# Patient Record
Sex: FEMALE | Race: BLACK OR AFRICAN AMERICAN | NOT HISPANIC OR LATINO | Employment: OTHER | URBAN - METROPOLITAN AREA
[De-identification: names, ages, dates, MRNs, and addresses within clinical notes are randomized per-mention and may not be internally consistent; named-entity substitution may affect disease eponyms.]

---

## 2018-04-05 ENCOUNTER — HISTORICAL (OUTPATIENT)
Dept: ADMINISTRATIVE | Facility: HOSPITAL | Age: 63
End: 2018-04-05

## 2018-04-05 LAB
ABS NEUT (OLG): 2.77 X10(3)/MCL (ref 2.1–9.2)
ALBUMIN SERPL-MCNC: 3.1 GM/DL (ref 3.4–5)
ALBUMIN/GLOB SERPL: 1 RATIO (ref 1–2)
ALP SERPL-CCNC: 124 UNIT/L (ref 45–117)
ALT SERPL-CCNC: 12 UNIT/L (ref 12–78)
APPEARANCE, UA: CLEAR
AST SERPL-CCNC: 17 UNIT/L (ref 15–37)
BACTERIA #/AREA URNS AUTO: ABNORMAL /[HPF]
BASOPHILS # BLD AUTO: 0.02 X10(3)/MCL
BASOPHILS NFR BLD AUTO: 0 %
BILIRUB SERPL-MCNC: 0.4 MG/DL (ref 0.2–1)
BILIRUB UR QL STRIP: NEGATIVE
BILIRUBIN DIRECT+TOT PNL SERPL-MCNC: 0.1 MG/DL
BILIRUBIN DIRECT+TOT PNL SERPL-MCNC: 0.3 MG/DL
BUN SERPL-MCNC: 8 MG/DL (ref 7–18)
CALCIUM SERPL-MCNC: 8.8 MG/DL (ref 8.5–10.1)
CHLORIDE SERPL-SCNC: 107 MMOL/L (ref 98–107)
CHOLEST SERPL-MCNC: 117 MG/DL
CHOLEST/HDLC SERPL: 4.2 {RATIO} (ref 0–4.4)
CO2 SERPL-SCNC: 28 MMOL/L (ref 21–32)
COLOR UR: ABNORMAL
CREAT SERPL-MCNC: 1 MG/DL (ref 0.6–1.3)
EOSINOPHIL # BLD AUTO: 0.42 10*3/UL
EOSINOPHIL NFR BLD AUTO: 8 %
ERYTHROCYTE [DISTWIDTH] IN BLOOD BY AUTOMATED COUNT: 14.6 % (ref 11.5–14.5)
EST. AVERAGE GLUCOSE BLD GHB EST-MCNC: 157 MG/DL
GLOBULIN SER-MCNC: 4.7 GM/ML (ref 2.3–3.5)
GLUCOSE (UA): NORMAL
GLUCOSE SERPL-MCNC: 106 MG/DL (ref 74–106)
HBA1C MFR BLD: 7.1 % (ref 4.2–6.3)
HCT VFR BLD AUTO: 43.7 % (ref 35–46)
HDLC SERPL-MCNC: 28 MG/DL
HGB BLD-MCNC: 14.2 GM/DL (ref 12–16)
HGB UR QL STRIP: NEGATIVE
HYALINE CASTS #/AREA URNS LPF: ABNORMAL /[LPF]
IMM GRANULOCYTES # BLD AUTO: 0.02 10*3/UL
IMM GRANULOCYTES NFR BLD AUTO: 0 %
KETONES UR QL STRIP: NEGATIVE
LDLC SERPL CALC-MCNC: 65 MG/DL (ref 0–130)
LEUKOCYTE ESTERASE UR QL STRIP: 25 LEU/UL
LYMPHOCYTES # BLD AUTO: 1.45 X10(3)/MCL
LYMPHOCYTES NFR BLD AUTO: 29 % (ref 13–40)
MCH RBC QN AUTO: 29.8 PG (ref 26–34)
MCHC RBC AUTO-ENTMCNC: 32.5 GM/DL (ref 31–37)
MCV RBC AUTO: 91.6 FL (ref 80–100)
MONOCYTES # BLD AUTO: 0.34 X10(3)/MCL
MONOCYTES NFR BLD AUTO: 7 % (ref 4–12)
NEUTROPHILS # BLD AUTO: 2.77 X10(3)/MCL
NEUTROPHILS NFR BLD AUTO: 55 X10(3)/MCL
NITRITE UR QL STRIP: NEGATIVE
PH UR STRIP: 6.5 [PH] (ref 4.5–8)
PLATELET # BLD AUTO: 260 X10(3)/MCL (ref 130–400)
PMV BLD AUTO: 10.5 FL (ref 7.4–10.4)
POTASSIUM SERPL-SCNC: 3.9 MMOL/L (ref 3.5–5.1)
PROT SERPL-MCNC: 7.8 GM/DL (ref 6.4–8.2)
PROT UR QL STRIP: NEGATIVE
RBC # BLD AUTO: 4.77 X10(6)/MCL (ref 4–5.2)
RBC #/AREA URNS AUTO: ABNORMAL /[HPF]
SODIUM SERPL-SCNC: 140 MMOL/L (ref 136–145)
SP GR UR STRIP: 1 (ref 1–1.03)
SQUAMOUS #/AREA URNS LPF: ABNORMAL /[LPF]
T4 FREE SERPL-MCNC: 1.38 NG/DL (ref 0.76–1.46)
TRIGL SERPL-MCNC: 122 MG/DL
TSH SERPL-ACNC: 3.83 MIU/L (ref 0.36–3.74)
UROBILINOGEN UR STRIP-ACNC: NORMAL
VLDLC SERPL CALC-MCNC: 24 MG/DL
WBC # SPEC AUTO: 5 X10(3)/MCL (ref 4.5–11)
WBC #/AREA URNS AUTO: ABNORMAL /HPF

## 2021-01-17 ENCOUNTER — HOSPITAL ENCOUNTER (OUTPATIENT)
Dept: OCCUPATIONAL THERAPY | Facility: HOSPITAL | Age: 66
End: 2021-01-20
Attending: INTERNAL MEDICINE | Admitting: INTERNAL MEDICINE

## 2021-01-17 LAB
ABS NEUT (OLG): 5.33 X10(3)/MCL (ref 2.1–9.2)
ALBUMIN SERPL-MCNC: 2.6 GM/DL (ref 3.4–4.8)
ALBUMIN/GLOB SERPL: 0.7 RATIO (ref 1.1–2)
ALP SERPL-CCNC: 82 UNIT/L (ref 40–150)
ALT SERPL-CCNC: 7 UNIT/L (ref 0–55)
ANION GAP SERPL CALC-SCNC: 12 MMOL/L
APPEARANCE, UA: CLEAR
AST SERPL-CCNC: 17 UNIT/L (ref 5–34)
BACTERIA SPEC CULT: ABNORMAL /HPF
BASOPHILS # BLD AUTO: 0 X10(3)/MCL (ref 0–0.2)
BASOPHILS NFR BLD AUTO: 0 %
BILIRUB SERPL-MCNC: 0.4 MG/DL
BILIRUB UR QL STRIP: NEGATIVE
BILIRUBIN DIRECT+TOT PNL SERPL-MCNC: 0.2 MG/DL (ref 0–0.5)
BILIRUBIN DIRECT+TOT PNL SERPL-MCNC: 0.2 MG/DL (ref 0–0.8)
BUN SERPL-MCNC: 18.6 MG/DL (ref 9.8–20.1)
BUN SERPL-MCNC: 25 MG/DL (ref 8–26)
CALCIUM SERPL-MCNC: 8.3 MG/DL (ref 8.4–10.2)
CHLORIDE SERPL-SCNC: 104 MMOL/L (ref 98–109)
CHLORIDE SERPL-SCNC: 106 MMOL/L (ref 98–107)
CO2 SERPL-SCNC: 22 MMOL/L (ref 23–31)
COLOR UR: YELLOW
CREAT SERPL-MCNC: 2.74 MG/DL (ref 0.55–1.02)
CREAT SERPL-MCNC: 3.1 MG/DL (ref 0.6–1.3)
EOSINOPHIL # BLD AUTO: 0.5 X10(3)/MCL (ref 0–0.9)
EOSINOPHIL NFR BLD AUTO: 6 %
ERYTHROCYTE [DISTWIDTH] IN BLOOD BY AUTOMATED COUNT: 21.2 % (ref 11.5–17)
GLOBULIN SER-MCNC: 3.5 GM/DL (ref 2.4–3.5)
GLUCOSE (UA): NEGATIVE
GLUCOSE SERPL-MCNC: 101 MG/DL (ref 82–115)
GLUCOSE SERPL-MCNC: 103 MG/DL (ref 70–105)
GROUP & RH: NORMAL
HCT VFR BLD AUTO: 24.4 % (ref 37–47)
HCT VFR BLD CALC: 25 % (ref 38–51)
HGB BLD-MCNC: 7.5 GM/DL (ref 12–16)
HGB BLD-MCNC: 8.5 MG/DL (ref 12–17)
HGB UR QL STRIP: ABNORMAL
KETONES UR QL STRIP: NEGATIVE
LEUKOCYTE ESTERASE UR QL STRIP: ABNORMAL
LYMPHOCYTES # BLD AUTO: 1.8 X10(3)/MCL (ref 0.6–4.6)
LYMPHOCYTES NFR BLD AUTO: 22 %
MCH RBC QN AUTO: 26.5 PG (ref 27–31)
MCHC RBC AUTO-ENTMCNC: 30.7 GM/DL (ref 33–36)
MCV RBC AUTO: 86.2 FL (ref 80–94)
MONOCYTES # BLD AUTO: 0.6 X10(3)/MCL (ref 0.1–1.3)
MONOCYTES NFR BLD AUTO: 7 %
NEUTROPHILS # BLD AUTO: 5.33 X10(3)/MCL (ref 2.1–9.2)
NEUTROPHILS NFR BLD AUTO: 64 %
NITRITE UR QL STRIP: NEGATIVE
PH UR STRIP: 5 [PH] (ref 5–9)
PLATELET # BLD AUTO: 251 X10(3)/MCL (ref 130–400)
PMV BLD AUTO: 10.9 FL (ref 9.4–12.4)
POC IONIZED CALCIUM: 1.11 MMOL/L (ref 1.12–1.32)
POC TCO2: 27 MMOL/L (ref 24–29)
POTASSIUM BLD-SCNC: 4.6 MMOL/L (ref 3.5–4.9)
POTASSIUM SERPL-SCNC: 4.8 MMOL/L (ref 3.5–5.1)
PROT SERPL-MCNC: 6.1 GM/DL (ref 5.8–7.6)
PROT UR QL STRIP: NEGATIVE
RBC # BLD AUTO: 2.83 X10(6)/MCL (ref 4.2–5.4)
RBC #/AREA URNS HPF: ABNORMAL /[HPF]
SODIUM BLD-SCNC: 137 MMOL/L (ref 138–146)
SODIUM SERPL-SCNC: 140 MMOL/L (ref 136–145)
SP GR UR STRIP: 1.02 (ref 1–1.03)
SQUAMOUS EPITHELIAL, UA: ABNORMAL
UROBILINOGEN UR STRIP-ACNC: 0.2
WBC # SPEC AUTO: 8.3 X10(3)/MCL (ref 4.5–11.5)
WBC #/AREA URNS HPF: 33 /HPF (ref 0–3)

## 2021-01-18 LAB
CROSSMATCH INTERPRETATION: NORMAL
HCT VFR BLD AUTO: 27.1 % (ref 37–47)
HGB BLD-MCNC: 8.6 GM/DL (ref 12–16)
PRODUCT READY: NORMAL
PRODUCT READY: NORMAL
TRANSFUSION ORDER: NORMAL

## 2021-01-19 LAB
ABS NEUT (OLG): 5.45 X10(3)/MCL (ref 2.1–9.2)
BASOPHILS # BLD AUTO: 0 X10(3)/MCL (ref 0–0.2)
BASOPHILS NFR BLD AUTO: 0 %
BUN SERPL-MCNC: 9.4 MG/DL (ref 9.8–20.1)
CALCIUM SERPL-MCNC: 7.6 MG/DL (ref 8.4–10.2)
CHLORIDE SERPL-SCNC: 110 MMOL/L (ref 98–107)
CO2 SERPL-SCNC: 20 MMOL/L (ref 23–31)
CREAT SERPL-MCNC: 1.49 MG/DL (ref 0.55–1.02)
CREAT/UREA NIT SERPL: 6
EOSINOPHIL # BLD AUTO: 0.5 X10(3)/MCL (ref 0–0.9)
EOSINOPHIL NFR BLD AUTO: 6 %
ERYTHROCYTE [DISTWIDTH] IN BLOOD BY AUTOMATED COUNT: 19.6 % (ref 11.5–17)
GLUCOSE SERPL-MCNC: 98 MG/DL (ref 82–115)
HCT VFR BLD AUTO: 24.7 % (ref 37–47)
HGB BLD-MCNC: 8 GM/DL (ref 12–16)
IMM GRANULOCYTES # BLD AUTO: 0 10*3/UL
IMM GRANULOCYTES NFR BLD AUTO: 1 %
LYMPHOCYTES # BLD AUTO: 1.2 X10(3)/MCL (ref 0.6–4.6)
LYMPHOCYTES NFR BLD AUTO: 15 %
MCH RBC QN AUTO: 27.7 PG (ref 27–31)
MCHC RBC AUTO-ENTMCNC: 32.4 GM/DL (ref 33–36)
MCV RBC AUTO: 85.5 FL (ref 80–94)
MONOCYTES # BLD AUTO: 0.7 X10(3)/MCL (ref 0.1–1.3)
MONOCYTES NFR BLD AUTO: 9 %
NEUTROPHILS # BLD AUTO: 5.45 X10(3)/MCL (ref 2.1–9.2)
NEUTROPHILS NFR BLD AUTO: 69 %
PLATELET # BLD AUTO: 212 X10(3)/MCL (ref 130–400)
PMV BLD AUTO: 10.5 FL (ref 9.4–12.4)
POTASSIUM SERPL-SCNC: 4.3 MMOL/L (ref 3.5–5.1)
RBC # BLD AUTO: 2.89 X10(6)/MCL (ref 4.2–5.4)
RESTICK: NORMAL
SODIUM SERPL-SCNC: 137 MMOL/L (ref 136–145)
WBC # SPEC AUTO: 7.9 X10(3)/MCL (ref 4.5–11.5)

## 2021-01-20 LAB
ABS NEUT (OLG): 5.3 X10(3)/MCL (ref 2.1–9.2)
ALBUMIN SERPL-MCNC: 2.4 GM/DL (ref 3.4–4.8)
ALBUMIN/GLOB SERPL: 0.6 RATIO (ref 1.1–2)
ALP SERPL-CCNC: 72 UNIT/L (ref 40–150)
ALT SERPL-CCNC: 7 UNIT/L (ref 0–55)
AST SERPL-CCNC: 15 UNIT/L (ref 5–34)
BASOPHILS # BLD AUTO: 0 X10(3)/MCL (ref 0–0.2)
BASOPHILS NFR BLD AUTO: 0 %
BILIRUB SERPL-MCNC: 0.3 MG/DL
BILIRUBIN DIRECT+TOT PNL SERPL-MCNC: 0.1 MG/DL (ref 0–0.8)
BILIRUBIN DIRECT+TOT PNL SERPL-MCNC: 0.2 MG/DL (ref 0–0.5)
BUN SERPL-MCNC: 7.5 MG/DL (ref 9.8–20.1)
CALCIUM SERPL-MCNC: 8 MG/DL (ref 8.4–10.2)
CHLORIDE SERPL-SCNC: 110 MMOL/L (ref 98–107)
CO2 SERPL-SCNC: 22 MMOL/L (ref 23–31)
CREAT SERPL-MCNC: 1.11 MG/DL (ref 0.55–1.02)
EOSINOPHIL # BLD AUTO: 0.5 X10(3)/MCL (ref 0–0.9)
EOSINOPHIL NFR BLD AUTO: 6 %
ERYTHROCYTE [DISTWIDTH] IN BLOOD BY AUTOMATED COUNT: 19.9 % (ref 11.5–17)
FINAL CULTURE: NORMAL
GLOBULIN SER-MCNC: 3.7 GM/DL (ref 2.4–3.5)
GLUCOSE SERPL-MCNC: 92 MG/DL (ref 82–115)
HCT VFR BLD AUTO: 28.8 % (ref 37–47)
HGB BLD-MCNC: 9 GM/DL (ref 12–16)
LYMPHOCYTES # BLD AUTO: 1.3 X10(3)/MCL (ref 0.6–4.6)
LYMPHOCYTES NFR BLD AUTO: 17 %
MCH RBC QN AUTO: 26.9 PG (ref 27–31)
MCHC RBC AUTO-ENTMCNC: 31.3 GM/DL (ref 33–36)
MCV RBC AUTO: 86.2 FL (ref 80–94)
MONOCYTES # BLD AUTO: 0.4 X10(3)/MCL (ref 0.1–1.3)
MONOCYTES NFR BLD AUTO: 6 %
NEUTROPHILS # BLD AUTO: 5.3 X10(3)/MCL (ref 2.1–9.2)
NEUTROPHILS NFR BLD AUTO: 70 %
PLATELET # BLD AUTO: 240 X10(3)/MCL (ref 130–400)
PMV BLD AUTO: 10.2 FL (ref 9.4–12.4)
POTASSIUM SERPL-SCNC: 3.8 MMOL/L (ref 3.5–5.1)
PROT SERPL-MCNC: 6.1 GM/DL (ref 5.8–7.6)
RBC # BLD AUTO: 3.34 X10(6)/MCL (ref 4.2–5.4)
SODIUM SERPL-SCNC: 139 MMOL/L (ref 136–145)
WBC # SPEC AUTO: 7.6 X10(3)/MCL (ref 4.5–11.5)

## 2022-04-10 ENCOUNTER — HISTORICAL (OUTPATIENT)
Dept: ADMINISTRATIVE | Facility: HOSPITAL | Age: 67
End: 2022-04-10

## 2022-04-28 VITALS
DIASTOLIC BLOOD PRESSURE: 79 MMHG | WEIGHT: 280 LBS | OXYGEN SATURATION: 99 % | HEIGHT: 63 IN | SYSTOLIC BLOOD PRESSURE: 132 MMHG | BODY MASS INDEX: 49.61 KG/M2

## 2022-04-30 NOTE — H&P
Patient:   Deanne Landis             MRN: [id number]            FIN: [id number]               Age:   65 years     Sex:  Female     :  1955   Associated Diagnoses:   None   Author:   Javier QUIROZ, Fernando      Basic Information   Source of history:  Self.    Present at bedside:  Medical personnel.    Referral source:  Emergency department.    History limitation:  None.       Chief Complaint   2021 18:53 CST      pt c/o rectal pain x2 weeks and vaginal bleeding x2 months. Denies weakness and fatigue. Pt reports ileostomy in Nov.      History of Present Illness   66 y/o female well known to me with significant medical history of htn, dm2, anxiety, obesity, recent colon cancer s/p subtotal colectomy with ileostomy in place, austen le lymphedema with recent diagnosed afib on eliquis presented to the ed with complaints of low abd pain and worsening vaginal bleeding and further work up and imaging suggestive of symptomatic anemia and furthera dmitted for blood transfusions along with gyn consult and iv abx for ileostomy site cellulitis      Review of Systems   Constitutional:  Weakness.    Eye:  Negative.    Ear/Nose/Mouth/Throat:  Negative.    Respiratory:  Negative.    Cardiovascular:  Negative.    Gastrointestinal:  Abdominal pain.    Genitourinary:  Negative.    Hematology/Lymphatics:  Negative.    Endocrine:  Negative.    Immunologic:  Negative.    Musculoskeletal:  Neck pain, Decreased range of motion.    Integumentary:  Negative.    Neurologic:  Alert and oriented X4, Abnormal balance, Numbness, Tingling, Headache.    Psychiatric:  Negative.    All other systems are negative      Health Status   Allergies:    Allergic Reactions (Selected)  Severity Not Documented  Penicillins- No reactions were documented.,    Allergies (1) Active Reaction  penicillins None Documented     Current medications:  (Selected)   Inpatient Medications  Ordered  Dextrose 50% and Water  (50 mL vial/syringe): 25 mL, 12.5 gm =,  form: Injection, IV Push, As Directed PRN for blood glucose, Infuse over: 5 minute(s), first dose 01/18/21 1:32:00 CST, Unconscious patient: Repeat as ordered per protocol.  Dextrose 50% and Water  (50 mL vial/syringe): 25 mL, 12.5 gm =, form: Injection, IV Push, Once PRN for blood glucose, Infuse over: 5 minute(s), first dose 01/18/21 1:32:00 CST, Unconscious patient: Look for other source of altered mental status.  Dextrose 50% and Water  (50 mL vial/syringe): 50 mL, 25 gm =, form: Injection, IV Push, As Directed PRN for blood glucose, Infuse over: 5 minute(s), first dose 01/18/21 1:32:00 CST, Unconscious patient: Repeat as ordered per protocol.  Dextrose 50% in Water intravenous solution: 25 mL, 12.5 gm =, form: Injection, IV Push, As Directed PRN for blood glucose, Infuse over: 5 minute(s), first dose 01/18/21 1:32:00 CST, Conscious patient.  Rocephin (for IVPB): 1,000 mg, form: Injection, IV Piggyback, q24hr, Infuse over: 30 minute(s), first dose 01/17/21 22:00:00 CST  Sodium Chloride 0.9% 1000mL 1,000 mL: 1,000 mL, 1,000 mL, IV, Bolus, order duration: 1 dose(s), start date 01/17/21 20:11:00 CST, stop date 01/18/21 20:10:00 CST, 2.3, m2  Sodium Chloride 0.9% intravenous solution 1,000 mL: 1,000 mL, 1,000 mL, IV, 125 mL/hr, start date 01/18/21 1:32:00 CST, 2.3, m2  Zofran 2 mg/mL injectable solution: 4 mg, form: Injection, IV Push, Once, first dose 11/26/20 5:58:00 CST, stop date 11/26/20 5:58:00 CST, STAT  Zofran: 4 mg, form: Injection, IV Push, q4hr PRN for nausea, first dose 01/18/21 1:32:00 CST, choose first if ordered with other treatments for nausea  acetaminophen: 1,000 mg, form: Tab, Oral, q6hr PRN for pain, first dose 01/18/21 1:32:00 CST, mild/moderate  acetaminophen: 650 mg, form: Liquid, Oral, q6hr PRN for fever, first dose 01/18/21 1:32:00 CST, > 38.1 degrees Celsius  albuterol: 3 mL, 2.5 mg =, form: Soln-Inh, NEB, q4hr Resp PRN for wheezing, first dose 01/18/21 1:32:00 CST  cloNIDine: 0.1 mg, form:  Tab, Oral, Once, first dose 09/21/17 20:19:00 CDT, stop date 09/21/17 20:19:00 CDT, STAT, 24  glucagon: 1 mg, form: Injection, IM, q10min PRN for blood glucose, first dose 01/18/21 1:32:00 CST, Conscious Patient with NO IV access available and BG < 45 mg/dl.  glucagon: 1 mg, form: Injection, IM, q10min PRN for blood glucose, first dose 01/18/21 1:32:00 CST, Unconscious patient: Patient with NO IV access available and BG < 70 mg/dl.  insulin lispro: 2-14 units, form: Soln, Subcutaneous, As Directed PRN for blood glucose, first dose 01/18/21 1:32:00 CST  lidocaine 4% topical solution: 1 chetna, form: Soln-Top, TOP, As Directed PRN for pain, severe, first dose 01/18/21 1:32:00 CST, for rectal pain, topical  metoprolol succinate 25 mg oral tablet, extended release: 25 mg, form: Tab XL, Oral, Once, first dose 09/21/17 20:20:00 CDT, stop date 09/21/17 20:20:00 CDT, STAT, 24  morphine 4 mg/mL preservative-free intravenous solution: 4 mg, form: Injection, IV Push, Once, first dose 11/26/20 5:57:00 CST, stop date 11/26/20 5:57:00 CST, STAT, ( > 7 on pain scale)  Prescriptions  Prescribed  ProAir HFA 90 mcg/inh inhalation aerosol with adapter: 2 puff(s), INH, q4hr, PRN for wheezing, # 8 gm, 0 Refill(s)  Documented Medications  Documented  FLUTICASONE PROP 50 MCG SPRAY:   acetaminophen-hydrocodone 325 mg-7.5 mg oral tablet: 1 TAB, Oral, q12hr, 0 Refill(s)  atorvastatin 20 mg oral tablet: 20 mg = 1 tab(s), Oral, # 30 tab(s), 0 Refill(s)  losartan 100 mg oral tablet: 100 mg = 1 tab(s), Oral, Daily  meclizine 25 mg oral tablet: 25 mg = 1 tab(s), Oral, Daily, PRN PRN as needed for nausea/vomiting, # 30 tab(s), 0 Refill(s)  metoprolol succinate 25 mg oral tablet, extended release: 12.5 mg = 0.5 tab(s), Oral, BID, # 90 tab(s), 0 Refill(s),    Medications (15) Active  Scheduled: (1)  cefTRIAXone  1,000 mg 1 EA, IV Piggyback, q24hr  Continuous: (2)  sodium chloride 0.9% 1,000 mL  1,000 mL, IV  sodium chloride 0.9% 1,000 mL  1,000 mL, IV,  125 mL/hr  PRN: (12)  acetaminophen 500 mg Tab  1,000 mg 2 tab(s), Oral, q6hr  acetaminophen 650 mg/20.3mL Liqu Adult UD  650 mg 20.3 mL, Oral, q6hr  albuterol 0.083% Sol 3 mL UD  2.5 mg 3 mL, NEB, q4hr Resp  dextrose 50% abboj  12.5 gm 25 mL, IV Push, As Directed  dextrose 50% abboj  12.5 gm 25 mL, IV Push, Once  dextrose 50% abboj  12.5 gm 25 mL, IV Push, As Directed  dextrose 50% abboj  25 gm 50 mL, IV Push, As Directed  glucagon recombinant 1 mg Inj  1 mg 1 EA, IM, q10min  glucagon recombinant 1 mg Inj  1 mg 1 EA, IM, q10min  insulin lispro 100 units/mL - 10mL vial  2-14 units, Subcutaneous, As Directed  lidocaine top 4% Sol - 50 mL  1 chetna, TOP, As Directed  ondansetron 2 mg/mL inj - 2mL  4 mg 2 mL, IV Push, q4hr     Problem list:    All Problems  Arthritis / SNOMED CT 6770168 / Confirmed  HTN / ICD-9-.9 / Confirmed  Hypercholesterolaemia / SNOMED CT 684491397 / Confirmed  Hypertension / SNOMED CT 97254487 / Confirmed  Lumbago / SNOMED CT 418102929 / Confirmed  Morbid obesity / SNOMED CT 319174172 / Probable  Morbid obesity / SNOMED CT 581526133 / Probable,    Active Problems (7)  Arthritis   HTN   Hypercholesterolaemia   Hypertension   Lumbago   Morbid obesity   Morbid obesity         Histories   Past Medical History:    Active  HTN (401.9)  Resolved  Diabetes mellitus (926523239):  Resolved.   Family History:    Seizure  Sister  Diabetes  Father  Coronary artery disease  Mother  Asthma.  Mother     Procedure history:    Exploration Laparotomy (.) performed by Sekou Tubbs MD on 11/27/2020 at 65 Years.  Comments:  11/27/2020 7:18 Sanjana Dior RN  auto-populated from documented surgical case  Colectomy performed by Sekou Tubbs MD on 11/27/2020 at 65 Years.  Comments:  12/9/2020 7:17 Sanjana Dior RN  auto-populated from documented surgical case  Ileostomy performed by Sekou Tubbs MD on 11/27/2020 at 65 Years.  Comments:  12/9/2020 7:17 SAKINA - Farhat THOMAS,  Sanjana  auto-populated from documented surgical case  Ligation or transection of fallopian tube(s), abdominal or vaginal approach, unilateral or bilateral (CPT4 61435).  denies.   Social History        Social & Psychosocial Habits    Alcohol  10/30/2012 Risk Assessment: Denies Alcohol Use    10/16/2017  Use: Never    08/27/2018  Use: Never    Employment/School  08/27/2018  Status: disabled    Exercise  12/17/2018  Duration (average number of minutes): 0    Self assessment: Poor condition    Home/Environment  12/17/2018  Lives with: Children    Nutrition/Health  12/17/2018  Type of diet: Regular    Caffeine intake amount: no, regular sprite    Sexual  12/17/2018  Sexually active: No    Substance Use  10/30/2012 Risk Assessment: Denies Substance Abuse    10/16/2017  Use: Never    08/27/2018  Use: Never    Tobacco  10/30/2012 Risk Assessment: Denies Tobacco Use    03/15/2019  Use: Never (less than 100 in l    Patient Wants Consult For Cessation Counseling N/A    01/31/2019  Use: Never (less than 100 in l    Patient Wants Consult For Cessation Counseling N/A    03/11/2019  Use: Never (less than 100 in l    Patient Wants Consult For Cessation Counseling N/A    02/07/2020  Use: Never (less than 100 in l    Patient Wants Consult For Cessation Counseling N/A    02/20/2020  Use: Never (less than 100 in l    Patient Wants Consult For Cessation Counseling No    03/29/2020  Use: Never (less than 100 in l    Patient Wants Consult For Cessation Counseling No    04/23/2020  Use: Never (less than 100 in l    Patient Wants Consult For Cessation Counseling No    11/14/2020  Use: Never (less than 100 in l    Patient Wants Consult For Cessation Counseling N/A    11/26/2020  Use: Never (less than 100 in l    Patient Wants Consult For Cessation Counseling No    01/17/2021  Use: Never (less than 100 in l    Patient Wants Consult For Cessation Counseling N/A    01/18/2021  Use: Never (less than 100 in l    Patient Wants Consult For  Cessation Counseling N/A    Abuse/Neglect  02/07/2020  SHX Any signs of abuse or neglect No    02/20/2020  SHX Any signs of abuse or neglect No    03/29/2020  SHX Any signs of abuse or neglect No    04/23/2020  SHX Any signs of abuse or neglect No    11/14/2020  SHX Any signs of abuse or neglect No    11/26/2020  SHX Any signs of abuse or neglect No    01/17/2021  SHX Any signs of abuse or neglect No    01/18/2021  SHX Any signs of abuse or neglect No  .        Physical Examination   General:  Alert and oriented.    Eye:  Pupils are equal, round and reactive to light, Extraocular movements are intact, Normal conjunctiva, Vision unchanged.    HENT:  Normocephalic, Normal hearing, Oral mucosa is moist, No pharyngeal erythema.    Neck:  Supple, Non-tender, No carotid bruit.    Respiratory:  Lungs are clear to auscultation, Respirations are non-labored, Breath sounds are equal, No chest wall tenderness.    Cardiovascular:  Normal rate, Regular rhythm, No murmur, No gallop.    Gastrointestinal:  Soft, Non-tender, Non-distended, Normal bowel sounds, No organomegaly.    Genitourinary:  No costovertebral angle tenderness, No inguinal tenderness, No urethral discharge.       Vital Signs (last 24 hrs)_____  Last Charted___________  Temp Oral     37.1 DegC  (JAN 18 07:00)  Heart Rate Peripheral   92 bpm  (JAN 18 07:29)  Resp Rate         18 br/min  (JAN 18 07:00)  SBP      117 mmHg  (JAN 18 07:29)  DBP      63 mmHg  (JAN 18 07:29)  SpO2      100 %  (JAN 18 07:29)  Weight      116 kg  (JAN 18 06:28)  Height      160 cm  (JAN 18 06:28)  BMI      45.31  (JAN 18 06:28)     Lymphatics:  No lymphadenopathy neck, axilla, groin.    Musculoskeletal:  Normal range of motion, Normal strength.    Neurologic:  Alert, Oriented, Normal sensory, Normal motor function, No focal deficits, Cranial Nerves II-XII are grossly intact.    Psychiatric:  Cooperative, Appropriate mood & affect, Normal judgment, Non-suicidal.       Review / Management    Results review:     Labs (Last four charted values)  WBC                  8.3 (JAN 17)   Hgb                  L 7.5 (JAN 17)   Hct                  L 24.4 (JAN 17)   Plt                  251 (JAN 17)   Na                   140 (JAN 17)   K                    4.8 (JAN 17)   CO2                  L 22 (JAN 17)   Cl                   106 (JAN 17)   Cr                   H 2.74 (JAN 17)   BUN                  18.6 (JAN 17)   Glucose Random       101 (JAN 17) .    Radiology results   Rad Results (ST)   Accession: [id number]  Order: CT Abdomen and Pelvis W Contrast  Report Dt/Tm: 01/18/2021 08:33  Report:   CT Abdomen and Pelvis W Contrast     REASON FOR STUDY: Abdominal Pain     TECHNIQUE: CT imaging was performed of the abdomen and pelvis  following administration of IV contrast. Automatic exposure control,  adjustment of mA/kV or iterative reconstruction technique was used to  limit radiation dose.     COMPARISON: CT abdomen/pelvis 12/18/2020      FINDINGS:      Thorax:  Lungs:The visualized lung bases appear unremarkable.  Pleura:No effusions or thickening.  Heart:The heart size is within normal limits.     Abdomen:  Abdominal Wall:Again noted is ileostomy in the right ventral abdominal  wall. There is associated surrounding subcutaneous fat stranding and  minimal fluid, not significantly changed since the prior examination.  Liver:Unremarkable appearing liver.  Biliary System:No intrahepatic or extrahepatic biliary duct dilatation  is seen.  Gallbladder:Surgical clips are seen in the gallbladder fossa  consistent with prior cholecystectomy.  Pancreas:Unremarkable appearing pancreas.  Spleen:Unremarkable appearing spleen.  Adrenals:The adrenal glands appear unremarkable.  Kidneys:The left kidney appears unremarkable with no stones cysts  masses or hydronephrosis. Small right renal cortical cysts are seen,  not well appreciated on the prior examination due to motion artifact.  The right kidney otherwise appears  unremarkable.  Aorta:Unremarkable.  IVC:Unremarkable.  Bowel:  Esophagus:The visualized distal esophagus appears unremarkable.  Stomach:The stomach appears unremarkable.  Duodenum:Unremarkable appearing duodenum.  Small Bowel:Nondistended. There is interval resolution of distal ileal  wall thickening and surrounding fat stranding. No evidence of bowel  obstruction.  Colon:The patient is status post partial colectomy. The distal sigmoid  colon and rectum appears unremarkable.  Peritoneum:No free air and no ascites.     Pelvis:  Bladder:Unremarkable.  Female:  Uterus:Unremarkable.  Ovaries:No adnexal masses are seen.     Bony structures:The bones are osteopenic.  Dorsal Spine:There is mild spondylosis of the visualized dorsal spine.  Multiple hemangiomas are seen involving T9, T11, T12 and L1 vertebral  bodies.        IMPRESSION:     1. Again noted is ileostomy in the right ventral abdominal wall. There  is associated surrounding subcutaneous fat stranding and minimal  fluid, not significantly changed since the prior examination. This  could reflect an infectious component including cellulitis. Correlate  with clinical and laboratory findings.     2. Details as above.        I agree with the preliminary report.               Impression and Plan   symptomatic anemia  blleding per vagina  ileostomy site cellulitis  htn  hld  obesity  dm 2  h/o colon cancer s/p subtotal colectomy  austen le lymphedema  anxiety  depression  afib    plan :  ivf  iv abx  ytransfuse 2 units prbc  gyn consult  hold ac  resume home meds  gi and dvt ppx  code status full

## 2022-04-30 NOTE — DISCHARGE SUMMARY
Patient:   Deanne Landis             MRN: [id number]            FIN: [id number]               Age:   65 years     Sex:  Female     :  1955   Associated Diagnoses:   None   Author:   Javier QUIROZ, Fernando      Basic Information   Source of history:  Self.    Present at bedside:  Medical personnel.    Referral source:  Emergency department.    History limitation:  None.       Chief Complaint      History of Present Illness   66 y/o female well known to me with significant medical history of htn, dm2, anxiety, obesity, recent colon cancer s/p subtotal colectomy with ileostomy in place, austen le lymphedema with recent diagnosed afib on eliquis presented to the ed with complaints of low abd pain and worsening vaginal bleeding and further work up and imaging suggestive of symptomatic anemia and furthera dmitted for blood transfusions along with gyn consult and iv abx for ileostomy site cellulitis    TODAY'S INFO : SEEN AND EXAMINED, H AND H BETTER POST TRANSFUSIONS  REMAINS ON IV ABX  S/P EMB  GYN RECCOMEND PROVERA      Review of Systems   Constitutional:  Weakness.    Eye:  Negative.    Ear/Nose/Mouth/Throat:  Negative.    Respiratory:  Negative.    Cardiovascular:  Negative.    Gastrointestinal:  Abdominal pain.    Genitourinary:  Negative.    Hematology/Lymphatics:  Negative.    Endocrine:  Negative.    Immunologic:  Negative.    Musculoskeletal:  Neck pain, Decreased range of motion.    Integumentary:  Negative.    Neurologic:  Alert and oriented X4, Abnormal balance, Numbness, Tingling, Headache.    Psychiatric:  Negative.    All other systems are negative      Health Status   Allergies:    Allergic Reactions (Selected)  Severity Not Documented  Penicillins- No reactions were documented.,    Allergies (1) Active Reaction  penicillins None Documented     Current medications:  (Selected)   Inpatient Medications  Ordered  Dextrose 50% and Water  (50 mL vial/syringe): 25 mL, 12.5 gm =, form: Injection, IV  Push, As Directed PRN for blood glucose, Infuse over: 5 minute(s), first dose 01/18/21 1:32:00 CST, Unconscious patient: Repeat as ordered per protocol.  Dextrose 50% and Water  (50 mL vial/syringe): 25 mL, 12.5 gm =, form: Injection, IV Push, Once PRN for blood glucose, Infuse over: 5 minute(s), first dose 01/18/21 1:32:00 CST, Unconscious patient: Look for other source of altered mental status.  Dextrose 50% and Water  (50 mL vial/syringe): 50 mL, 25 gm =, form: Injection, IV Push, As Directed PRN for blood glucose, Infuse over: 5 minute(s), first dose 01/18/21 1:32:00 CST, Unconscious patient: Repeat as ordered per protocol.  Dextrose 50% in Water intravenous solution: 25 mL, 12.5 gm =, form: Injection, IV Push, As Directed PRN for blood glucose, Infuse over: 5 minute(s), first dose 01/18/21 1:32:00 CST, Conscious patient.  Eliquis 2.5 mg oral tablet: 2.5 mg, form: Tab, Oral, BID, first dose 01/19/21 17:45:00 CST  Lubriderm Lotion: 1 chetna, form: Lotion, TOP, BID PRN for dry skin, first dose 01/19/21 12:27:00 CST  ProAir HFA 90 mcg/inh inhalation aerosol with adapter: 2 puff(s), 180 mcg =, form: Inhaler, INH, q4hr PRN for wheezing, first dose 01/18/21 9:59:00 CST  Rocephin (for IVPB): 1,000 mg, form: Injection, IV Piggyback, q24hr, Infuse over: 30 minute(s), first dose 01/17/21 22:00:00 CST  Sodium Chloride 0.9% intravenous solution 1,000 mL: 1,000 mL, 1,000 mL, IV, 125 mL/hr, start date 01/18/21 1:32:00 CST, 2.3, m2  Zofran 2 mg/mL injectable solution: 4 mg, form: Injection, IV Push, Once, first dose 11/26/20 5:58:00 CST, stop date 11/26/20 5:58:00 CST, STAT  Zofran: 4 mg, form: Injection, IV Push, q4hr PRN for nausea, first dose 01/18/21 1:32:00 CST, choose first if ordered with other treatments for nausea  acetaminophen-hydrocodone 325 mg-7.5 mg oral tablet: 1 tab(s), form: Tab, Oral, q12hr, first dose 01/18/21 9:58:00 CST  acetaminophen: 1,000 mg, form: Tab, Oral, q6hr PRN for pain, first dose 01/18/21 1:32:00  CST, mild/moderate  acetaminophen: 650 mg, form: Liquid, Oral, q6hr PRN for fever, first dose 01/18/21 1:32:00 CST, > 38.1 degrees Celsius  albuterol: 3 mL, 2.5 mg =, form: Soln-Inh, NEB, q4hr Resp PRN for wheezing, first dose 01/18/21 1:32:00 CST  atorvastatin: 20 mg, form: Tab, Oral, Daily, first dose 01/18/21 17:00:00 CST  cloNIDine: 0.1 mg, form: Tab, Oral, Once, first dose 09/21/17 20:19:00 CDT, stop date 09/21/17 20:19:00 CDT, STAT, 24  glucagon: 1 mg, form: Injection, IM, q10min PRN for blood glucose, first dose 01/18/21 1:32:00 CST, Conscious Patient with NO IV access available and BG < 45 mg/dl.  glucagon: 1 mg, form: Injection, IM, q10min PRN for blood glucose, first dose 01/18/21 1:32:00 CST, Unconscious patient: Patient with NO IV access available and BG < 70 mg/dl.  insulin lispro: 2-14 units, form: Soln, Subcutaneous, As Directed PRN for blood glucose, first dose 01/18/21 1:32:00 CST  lidocaine 4% topical solution: 1 chetna, form: Soln-Top, TOP, As Directed PRN for pain, severe, first dose 01/18/21 1:32:00 CST, for rectal pain, topical  losartan: 100 mg, form: Tab, Oral, Daily, first dose 01/19/21 9:00:00 CST  meclizine: 25 mg, form: Tab, Oral, Daily PRN for nausea/vomiting, first dose 01/18/21 9:59:00 CST  metoprolol succinate 25 mg oral tablet, extended release: 25 mg, form: Tab XL, Oral, BID, first dose 01/18/21 21:00:00 CST  metoprolol succinate 25 mg oral tablet, extended release: 25 mg, form: Tab XL, Oral, Once, first dose 09/21/17 20:20:00 CDT, stop date 09/21/17 20:20:00 CDT, STAT, 24  morphine 4 mg/mL preservative-free intravenous solution: 4 mg, form: Injection, IV Push, Once, first dose 11/26/20 5:57:00 CST, stop date 11/26/20 5:57:00 CST, STAT, ( > 7 on pain scale)  Prescriptions  Prescribed  Eliquis 2.5 mg oral tablet: 2.5 mg = 1 tab(s), Oral, BID, # 60 tab(s), 0 Refill(s), Pharmacy: [id number], 160.02, cm, Height/Length Dosing, 01/18/21 6:28:00 CST, 136.9, kg, Weight Dosing,  01/18/21 6:28:00 CST  Omnicef 300 mg oral capsule: 300 mg = 1 cap(s), Oral, q12hr, X 7 day(s), # 14 cap(s), 0 Refill(s), Pharmacy: [id number], 160.02, cm, Height/Length Dosing, 01/18/21 6:28:00 CST, 136.9, kg, Weight Dosing, 01/18/21 6:28:00 CST  ProAir HFA 90 mcg/inh inhalation aerosol with adapter: 2 puff(s), INH, q4hr, PRN for wheezing, # 8 gm, 0 Refill(s)  Provera 10 mg oral tablet: 10 mg = 1 tab(s), Oral, Daily, # 30 tab(s), 0 Refill(s), [facility] #[id number], 160.02, cm, Height/Length Dosing, 01/18/21 6:28:00 CST, 136.9, kg, Weight Dosing, 01/18/21 6:28:00 CST  Documented Medications  Documented  FLUTICASONE PROP 50 MCG SPRAY:   acetaminophen-hydrocodone 325 mg-7.5 mg oral tablet: 1 TAB, Oral, q12hr, 0 Refill(s)  atorvastatin 20 mg oral tablet: 20 mg = 1 tab(s), Oral, # 30 tab(s), 0 Refill(s)  losartan 100 mg oral tablet: 100 mg = 1 tab(s), Oral, Daily  meclizine 25 mg oral tablet: 25 mg = 1 tab(s), Oral, Daily, PRN PRN as needed for nausea/vomiting, # 30 tab(s), 0 Refill(s)  metoprolol succinate 25 mg oral tablet, extended release: 12.5 mg = 0.5 tab(s), Oral, BID, # 90 tab(s), 0 Refill(s),    Medications (22) Active  Scheduled: (6)  apixaban 2.5 mg tablet  2.5 mg 1 tab(s), Oral, BID  atorvastatin 10 mg Tab UD  20 mg 2 tab(s), Oral, Daily  cefTRIAXone  1,000 mg 1 EA, IV Piggyback, q24hr  hydrocodone 7.5mg/APAP 325mg  1 tab(s), Oral, q12hr  losartan 50 mg Tab UD  100 mg 2 tab(s), Oral, Daily  metoprolol succ. 25 mg XL Tab  25 mg 1 tab(s), Oral, BID  Continuous: (1)  sodium chloride 0.9% 1,000 mL  1,000 mL, IV, 125 mL/hr  PRN: (15)  acetaminophen 500 mg Tab  1,000 mg 2 tab(s), Oral, q6hr  acetaminophen 650 mg/20.3mL Liqu Adult UD  650 mg 20.3 mL, Oral, q6hr  albuterol 0.083% Sol 3 mL UD  2.5 mg 3 mL, NEB, q4hr Resp  albuterol CFC free 90 mcg/inh Aero  180 mcg 2 puff(s), INH, q4hr  dextrose 50% abboj  12.5 gm 25 mL, IV Push, As Directed  dextrose 50% abboj  12.5 gm 25 mL, IV Push,  Once  dextrose 50% abboj  12.5 gm 25 mL, IV Push, As Directed  dextrose 50% abboj  25 gm 50 mL, IV Push, As Directed  glucagon recombinant 1 mg Inj  1 mg 1 EA, IM, q10min  glucagon recombinant 1 mg Inj  1 mg 1 EA, IM, q10min  insulin lispro 100 units/mL - 10mL vial  2-14 units, Subcutaneous, As Directed  lidocaine top 4% Sol - 50 mL  1 chetna, TOP, As Directed  Lubriderm Lot-6oz  1 chetna, TOP, BID  meclizine 25 mg Tab UD  25 mg 1 tab(s), Oral, Daily  ondansetron 2 mg/mL inj - 2mL  4 mg 2 mL, IV Push, q4hr     Problem list:    All Problems  Arthritis / SNOMED CT 0144891 / Confirmed  HTN / ICD-9-.9 / Confirmed  Hypercholesterolaemia / SNOMED CT 346831417 / Confirmed  Hypertension / SNOMED CT 39038448 / Confirmed  Lumbago / SNOMED CT 717360585 / Confirmed  Morbid obesity / SNOMED CT 834178080 / Probable  Morbid obesity / SNOMED CT 265014586 / Probable  Resolved: Diabetes mellitus / SNOMED CT 908067072,    Active Problems (7)  Arthritis   HTN   Hypercholesterolaemia   Hypertension   Lumbago   Morbid obesity   Morbid obesity         Histories   Past Medical History:    Active  HTN (401.9)  Resolved  Diabetes mellitus (316378747):  Resolved.   Family History:    Seizure  Sister  Diabetes  Father  Coronary artery disease  Mother  Asthma.  Mother     Procedure history:    Exploration Laparotomy (.) performed by Sekou Tubbs MD on 11/27/2020 at 65 Years.  Comments:  11/27/2020 7:18 Sanjana Dior RN  auto-populated from documented surgical case  Colectomy performed by Sekou Tubbs MD on 11/27/2020 at 65 Years.  Comments:  12/9/2020 7:17 Sanjana Dior RN  auto-populated from documented surgical case  Ileostomy performed by Sekou Tubbs MD on 11/27/2020 at 65 Years.  Comments:  12/9/2020 7:17 Sanjana Dior RN  auto-populated from documented surgical case  Ligation or transection of fallopian tube(s), abdominal or vaginal approach, unilateral or bilateral (CPT4  39427).  denangel.   Social History        Social & Psychosocial Habits    Alcohol  10/30/2012 Risk Assessment: Denies Alcohol Use    10/16/2017  Use: Never    08/27/2018  Use: Never    Employment/School  08/27/2018  Status: disabled    Exercise  12/17/2018  Duration (average number of minutes): 0    Self assessment: Poor condition    Home/Environment  12/17/2018  Lives with: Children    Nutrition/Health  12/17/2018  Type of diet: Regular    Caffeine intake amount: no, regular sprite    Sexual  12/17/2018  Sexually active: No    Substance Use  10/30/2012 Risk Assessment: Denies Substance Abuse    10/16/2017  Use: Never    08/27/2018  Use: Never    Tobacco  10/30/2012 Risk Assessment: Denies Tobacco Use    03/15/2019  Use: Never (less than 100 in l    Patient Wants Consult For Cessation Counseling N/A    01/31/2019  Use: Never (less than 100 in l    Patient Wants Consult For Cessation Counseling N/A    03/11/2019  Use: Never (less than 100 in l    Patient Wants Consult For Cessation Counseling N/A    02/07/2020  Use: Never (less than 100 in l    Patient Wants Consult For Cessation Counseling N/A    02/20/2020  Use: Never (less than 100 in l    Patient Wants Consult For Cessation Counseling No    03/29/2020  Use: Never (less than 100 in l    Patient Wants Consult For Cessation Counseling No    04/23/2020  Use: Never (less than 100 in l    Patient Wants Consult For Cessation Counseling No    11/14/2020  Use: Never (less than 100 in l    Patient Wants Consult For Cessation Counseling N/A    11/26/2020  Use: Never (less than 100 in l    Patient Wants Consult For Cessation Counseling No    01/17/2021  Use: Never (less than 100 in l    Patient Wants Consult For Cessation Counseling N/A    01/18/2021  Use: Never (less than 100 in l    Patient Wants Consult For Cessation Counseling N/A    Abuse/Neglect  02/07/2020  SHX Any signs of abuse or neglect No    02/20/2020  SHX Any signs of abuse or neglect No    03/29/2020  SHX Any  signs of abuse or neglect No    04/23/2020  SHX Any signs of abuse or neglect No    11/14/2020  SHX Any signs of abuse or neglect No    11/26/2020  SHX Any signs of abuse or neglect No    01/17/2021  SHX Any signs of abuse or neglect No    01/18/2021  SHX Any signs of abuse or neglect No  .        Physical Examination   General:  Alert and oriented.    Eye:  Pupils are equal, round and reactive to light, Extraocular movements are intact, Normal conjunctiva, Vision unchanged.    HENT:  Normocephalic, Normal hearing, Oral mucosa is moist, No pharyngeal erythema.    Neck:  Supple, Non-tender, No carotid bruit.    Respiratory:  Lungs are clear to auscultation, Respirations are non-labored, Breath sounds are equal, No chest wall tenderness.    Cardiovascular:  Normal rate, Regular rhythm, No murmur, No gallop.    Gastrointestinal:  Soft, Non-tender, Non-distended, Normal bowel sounds, No organomegaly.    Genitourinary:  No costovertebral angle tenderness, No inguinal tenderness, No urethral discharge.       Vital Signs (last 24 hrs)_____  Last Charted___________  Temp Oral     36.5 DegC  (JAN 20 11:07)  Heart Rate Peripheral   60 bpm  (JAN 20 11:07)  Resp Rate         18 br/min  (JAN 20 11:00)  SBP      138 mmHg  (JAN 20 11:07)  DBP      69 mmHg  (JAN 20 11:07)  SpO2      100 %  (JAN 20 11:07)     Lymphatics:  No lymphadenopathy neck, axilla, groin.    Musculoskeletal:  Normal range of motion, Normal strength.    Neurologic:  Alert, Oriented, Normal sensory, Normal motor function, No focal deficits, Cranial Nerves II-XII are grossly intact.    Psychiatric:  Cooperative, Appropriate mood & affect, Normal judgment, Non-suicidal.       Review / Management   Results review:     Labs (Last four charted values)  WBC                  7.6 (JAN 20) 7.9 (JAN 19) 8.3 (JAN 17)   Hgb                  L 9.0 (JAN 20) L 8.0 (JAN 19) L 8.6 (JAN 18) L 7.5 (JAN 17)   Hct                  L 28.8 (JAN 20) L 24.7 (JAN 19) L 27.1 (JAN 18) L  24.4 (JAN 17)   Plt                  240 (JAN 20) 212 (JAN 19) 251 (JAN 17)   Na                   139 (JAN 20) 137 (JAN 19) 140 (JAN 17)   K                    3.8 (JAN 20) 4.3 (JAN 19) 4.8 (JAN 17)   CO2                  L 22 (JAN 20) L 20 (JAN 19) L 22 (JAN 17)   Cl                   H 110 (JAN 20) H 110 (JAN 19) 106 (JAN 17)   Cr                   H 1.11 (JAN 20) H 1.49 (JAN 19) H 2.74 (JAN 17)   BUN                  L 7.5 (JAN 20) L 9.4 (JAN 19) 18.6 (JAN 17)   Glucose Random       92 (JAN 20) 98 (JAN 19) 101 (JAN 17) .    Radiology results   Rad Results (ST)          Impression and Plan   symptomatic anemia  blleding per vagina  ileostomy site cellulitis  htn  hld  obesity  dm 2  h/o colon cancer s/p subtotal colectomy  austen le lymphedema  anxiety  depression  afib    plan :  ivf  iv abx  gyn consult  hold ac  resume home meds  gi and dvt ppx  code status full    DISPO : DC HOME ON HALF DOSE ELIQUIS AND PO ABX AND PROVERA WITH CLOSE PCP AND GYN FOLLOW UP

## 2022-04-30 NOTE — ED PROVIDER NOTES
Patient:   Deanne Landis             MRN: [id number]            FIN: [id number]               Age:   65 years     Sex:  Female     :  1955   Associated Diagnoses:   Vaginal bleeding; Pain in rectum; Acute blood loss anemia; ARF (acute renal failure)   Author:   Seamus Kraimi MD      Basic Information   Time seen: Date & time 2021 18:55:00.   History source: Patient.   Arrival mode: Private vehicle.   History limitation: None.   Additional information: Patient's physician(s): Pam Santana MD, Chief Complaint from Nursing Triage Note : Chief Complaint   2021 18:53 CST      Chief Complaint           pt c/o rectal pain x2 weeks and vaginal bleeding x2 months. Denies weakness and fatigue. Pt reports ileostomy in Nov.  .      History of Present Illness   The patient presents with   65 year old female presents to ED for vaginal bleeding and rectal pain since colectomy/ileostomy placement in 2020; Denies weakness, syncope - KAMILAH Sloan  and     I, Dr. Karimi, assumed care of this patient upon walking into the room at . 64 y/o AAF with a history of HTN, DM, colon cancer, and a subtotal colectomy with end ileostomy done 2020 presents to the ED with c/o vaginal bleeding since her procedure and rectal pain for 2 weeks. She states her rectal pain is heavy and rough as if there is blockage. Pt reports she has not seen a doctor since and is unsure why she needed the procedure. Pt reports some nausea and denies dysuria, vomiting, fever, chills, and abdominal pain. .  The onset was 2 months.  The course/duration of symptoms is constant.  Location: Vagina rectum. Radiating pain: none. The character of symptoms is pressure and heavy.  The degree of symptoms is moderate.  Risk factors consist of diabetes mellitus.  Prior episodes: none.  Therapy today: none.  Associated symptoms: nausea, denies fever, denies chills, denies vomiting, denies abdominal pain and denies dysuria.   Additional history: none.        Review of Systems   Constitutional symptoms:  No fever, no chills   Skin symptoms:  Negative except as documented in HPI   Eye symptoms:  Negative except as documented in HPI   ENMT symptoms:  Negative except as documented in HPI   Respiratory symptoms:  Negative except as documented in HPI   Cardiovascular symptoms:  Negative except as documented in HPI   Gastrointestinal symptoms:  Nausea, no abdominal pain, no vomiting   Genitourinary symptoms:  Vaginal bleeding, No dysuria,    Musculoskeletal symptoms:  Negative except as documented in HPI.   Neurologic symptoms:  Negative except as documented in HPI   Psychiatric symptoms:  Negative except as documented in HPI.   Endocrine symptoms:  Negative except as documented in HPI.   Hematologic/Lymphatic symptoms:  Negative except as documented in HPI   Allergy/immunologic symptoms:  Negative except as documented in HPI.      Health Status   Allergies:    Allergic Reactions (Selected)  Severity Not Documented  Penicillins- No reactions were documented..   Medications:  (Selected)   Inpatient Medications  Ordered  Zofran 2 mg/mL injectable solution: 4 mg, form: Injection, IV Push, Once, first dose 11/26/20 5:58:00 CST, stop date 11/26/20 5:58:00 CST, STAT  cloNIDine: 0.1 mg, form: Tab, Oral, Once, first dose 09/21/17 20:19:00 CDT, stop date 09/21/17 20:19:00 CDT, STAT, 24  metoprolol succinate 25 mg oral tablet, extended release: 25 mg, form: Tab XL, Oral, Once, first dose 09/21/17 20:20:00 CDT, stop date 09/21/17 20:20:00 CDT, STAT, 24  morphine 4 mg/mL preservative-free intravenous solution: 4 mg, form: Injection, IV Push, Once, first dose 11/26/20 5:57:00 CST, stop date 11/26/20 5:57:00 CST, STAT, ( > 7 on pain scale)  Prescriptions  Prescribed  ProAir HFA 90 mcg/inh inhalation aerosol with adapter: 2 puff(s), INH, q4hr, PRN for wheezing, # 8 gm, 0 Refill(s)  Documented Medications  Documented  FLUTICASONE PROP 50 MCG SPRAY:    acetaminophen-hydrocodone 325 mg-7.5 mg oral tablet: 1 TAB, Oral, q12hr, 0 Refill(s)  atorvastatin 20 mg oral tablet: 20 mg = 1 tab(s), Oral, # 30 tab(s), 0 Refill(s)  losartan 100 mg oral tablet: 100 mg = 1 tab(s), Oral, Daily  meclizine 25 mg oral tablet: 25 mg = 1 tab(s), Oral, Daily, PRN PRN as needed for nausea/vomiting, # 30 tab(s), 0 Refill(s)  metoprolol succinate 25 mg oral tablet, extended release: 12.5 mg = 0.5 tab(s), Oral, BID, # 90 tab(s), 0 Refill(s).      Past Medical/ Family/ Social History   Medical history:    Active  HTN (401.9)  Resolved  Diabetes mellitus ([id number]):  Resolved., Colon cancer.   Surgical history:    Exploration Laparotomy (.) on 11/27/2020 at 65 Years.  Comments:  11/27/2020 7:18 Sanjana Dior RN  auto-populated from documented surgical case  Colectomy on 11/27/2020 at 65 Years.  Comments:  12/9/2020 7:17 Sanjana Dior RN  auto-populated from documented surgical case  Ileostomy on 11/27/2020 at 65 Years.  Comments:  12/9/2020 7:17 Sanjana Dior RN  auto-populated from documented surgical case  Ligation or transection of fallopian tube(s), abdominal or vaginal approach, unilateral or bilateral (54508).  denies..   Family history:    Seizure  Sister  Diabetes  Father  Coronary artery disease  Mother  Asthma.  Mother  .   Social history: Alcohol use: Denies, Tobacco use: Denies, Drug use: Denies.      Physical Examination               Vital Signs   Vital Signs   1/17/2021 20:30 CST      Peripheral Pulse Rate     60 bpm                             Heart Rate Monitored      59 bpm  LOW                             Respiratory Rate          17 br/min                             SpO2                      100 %                             Oxygen Therapy            Room air                             Systolic Blood Pressure   103 mmHg                             Diastolic Blood Pressure  46 mmHg  LOW                             Mean Arterial Pressure, Cuff               65 mmHg  .   Measurements   1/17/2021 18:53 CST      Weight Dosing             136.9 kg                             Weight Measured and Calculated in Lbs     301.81 lb                             Weight Estimated          136.9 kg                             Height/Length Dosing      160.02 cm                             Height/Length Estimated   160.02 cm                             Body Mass Index Estimated 53.46 kg/m2  .   Basic Oxygen Information   1/17/2021 20:30 CST      SpO2                      100 %                             Oxygen Therapy            Room air  .   General:  Alert, no acute distress   Neurological:  Alert and oriented to person, place, time, and situation, No focal neurological deficit observed, CN II-XII intact, normal sensory observed, normal motor observed, normal speech observed   Skin:  Warm, dry, no rash   Head:  Normocephalic, atraumatic   Neck:  Supple, trachea midline, no JVD   Eye:  Pupils are equal, round and reactive to light, extraocular movements are intact, normal conjunctiva, vision unchanged   Ears, nose, mouth and throat:  Tympanic membranes clear, oral mucosa moist, no pharyngeal erythema or exudate.    Cardiovascular:  Regular rate and rhythm, No murmur, No edema   Respiratory:  Lungs are clear to auscultation, respirations are non-labored, breath sounds are equal, Symmetrical chest wall expansion.    Chest wall:  No tenderness   Back:  Normal range of motion.   Musculoskeletal:  Normal ROM, normal strength, no tenderness   Gastrointestinal:  Soft, Nontender, Non distended, Normal bowel sounds, No organomegaly, RLQ ileostomy, Rectal exam, extreme tenderness, No bleeding, hematoma, fistulas. Skin breakdown to left gluteal fold. No induration,.    Genitourinary:  Vaginal exam shows minimal bleeding. No bleeding to urethra, Considerable clots from cervical os..   Psychiatric:  Cooperative, appropriate mood & affect, normal judgment.             Medical Decision  Making   Differential Diagnosis:  Vaginal bleeding, postmenopausal bleeding.    Rationale:  Elderly female with a history of colon cancer status post recent subtotal colectomy with end ileostomy done about 3 months ago with new complaints of rectal pain but does not pass any stool per rectum anymore and also more concerning for vaginal bleeding ever since her surgery 3 months ago.  Vital signs stable.  Exam was significant pain on rectal exam but no blood no obvious soft abscess.  Pelvic exam with large amount of blood clots and mild amount of bright red bleeding coming from the cervical os.  Labs notable for worsening kidney function typically around 1 with her creatinine now around 3 given IV fluids bolus then later maintenance rate.  Other labs notable for UTI on UA and given 1 dose of Rocephin.  Type and screen sent and patient transfused 1 unit PRBCs after CBC shows but a 4 g drop her hemoglobin since last check concerning for likely source of vaginal bleeding.  Pelvic ultrasound with prominent endometrium for patient's age and blood products noted in the endometrial canal.  Dr. Anand with gynecology consulted recommends EMB and will follow.  CT of the abdomen pelvis to rule out any abscesses near the rectal area without acute changes to the rectum per radiology read.  Patient further admitted to care of Dr. King via Dr. Kenyon.  Of note, pain controlled via 4% lidocaine topically to the rectum without need for IV pain control..   Documents reviewed:  Emergency department nurses' notes.   Orders  Launch Orders   Laboratory:  ABO/Rh (Order): Stat collect, 1/17/2021 18:56 CST, Blood, Lab Collect, Print Label By Order Location, 1/17/2021 18:56 CST  Urinalysis with Reflex (Order): Stat collect, Urine, 1/17/2021 18:55 CST, Nurse collect, Print Label By Order Location  CBC w/ Auto Diff (Order): Stat collect, 1/17/2021 18:55 CST, Blood, Lab Collect, Print Label By Order Location, 1/17/2021 18:55 CST  CMP (Order): Stat  collect, 1/17/2021 18:55 CST, Blood, Lab Collect, Print Label By Order Location, 1/17/2021 18:55 CST, Launch Orders   Laboratory:  POC ISTAT Chem8 Request: (Order): Blood, Routine collect, 1/17/2021 18:56 CST by Timothy Toro PA-C, Lab Collect, Print Label By Order Location, launch Order Profile (Selected)   Inpatient Orders  Ordered  Intermittent Catheterization: 01/17/21 19:38:00 CST, Stop date 01/17/21 19:38:00 CST, 01/17/21 19:38:00 CST  Sodium Chloride 0.9% 1000mL 1,000 mL: 1,000 mL, 1,000 mL, IV, Bolus, order duration: 1 dose(s), start date 01/17/21 20:11:00 CST, stop date 01/18/21 20:10:00 CST, 2.3, m2  Type and Ab Screen: 01/17/21 20:12:00 CST, Stat collect, Blood, Lab Collect, Packed RBC, To Keep Ahead, 2, 01/17/21, Print Label By Order Location, 01/17/21 20:12:00 CST  Ordered (Collected)  Urine Culture [id number]: Stat collect, 01/17/21 20:00:00 CST, Urine, Collected, Nurse collect, [id number], Stop date 01/17/21 20:00:00 CST, Print Label By Order Location  Ordered (In-Lab)  POC ISTAT Chem8 Request:: Blood, Routine collect, 01/17/21 18:56:00 CST by Timothy Toro PA-C, Stop date 01/17/21 20:00:00 CST, Lab Collect, Print Label By Order Location  Canceled  ABO/Rh Retype: Now collect, 01/17/21 19:03:14 CST, Blood, Stop date 01/17/21 20:08:00 CST, Lab Collect  Completed  ABO/Rh Retype: Now collect, 01/17/21 19:03:14 CST, Blood, Stop date 01/17/21 19:03:00 CST, Lab Collect, Print Label By Order Location, 01/17/21 18:56:00 CST  ABO/Rh: STAT collect, 01/17/21 19:03:14 CST, BLOOD, Collected, Stop date 01/17/21 19:03:00 CST, Lab Collect  Automated Diff: STAT collect, 01/17/21 19:03:00 CST, Blood, Collected, Stop date 01/17/21 19:03:00 CST, Lab Collect, Print Label By Order Location, 01/17/21 18:55:00 CST  CBC w/ Auto Diff: STAT collect, 01/17/21 19:03:14 CST, BLOOD, Collected, Stop date 01/17/21 19:03:00 CST, Lab Collect  CMP: STAT collect, 01/17/21 19:03:14 CST, BLOOD, Collected, Stop date 01/17/21 19:03:00  CST, Lab Collect  Estimated Glomerular Filtration Rate: STAT collect, 01/17/21 19:03:00 CST, Blood, Collected, Stop date 01/17/21 19:03:00 CST, Lab Collect, Print Label By Order Location, 01/17/21 18:55:00 CST  Point of Care iSTAT Chem8: Blood, Stat collect, Collected, 01/17/21 19:38:58 CST  Urinalysis with Reflex: Stat collect, Urine, 01/17/21 18:55:00 CST, Stop date 01/17/21 18:56:00 CST, Nurse collect, Print Label By Order Location.    Results review:  Lab results : Lab View   1/17/2021 20:00 CST      UA Appear                 CLEAR                             UA Color                  YELLOW                             UA Spec Grav              1.016                             UA Bili                   Negative                             UA pH                     5.0                             UA Urobilinogen           0.2                             UA Blood                  1+                             UA Glucose                Negative                             UA Ketones                Negative                             UA Protein                Negative                             UA Nitrite                Negative                             UA Leuk Est               2+                             UA WBC                    33 /HPF  HI                             UA RBC                    NONE SEEN                             UA Bacteria               NONE SEEN /HPF                             UA Squam Epithelial       NONE SEEN    1/17/2021 19:41 CST      Est Creat Clearance Ser   14.57 mL/min    1/17/2021 19:38 CST      POC Sodium                137 mmol/L  LOW                             POC Potassium             4.6 mmol/L                             POC Chloride              104 mmol/L                             POC Ion Calcium           1.11 mmol/L  LOW                             POC Glucose               103 mg/dL                             POC BUN                   25.0 mg/dL                              POC Creatinine            3.1 mg/dL  HI                             POC AGAP                  12.0  NA                             POC Hb                    8.5 mg/dL  LOW                             POC Hct                   25.0 %  LOW                             POC TCO2                  27.0 mmol/L    1/17/2021 19:03 CST      Sodium Lvl                140 mmol/L                             Potassium Lvl             4.8 mmol/L                             Chloride                  106 mmol/L                             CO2                       22 mmol/L  LOW                             Calcium Lvl               8.3 mg/dL  LOW                             Glucose Lvl               101 mg/dL                             BUN                       18.6 mg/dL                             Creatinine                2.74 mg/dL  HI                             eGFR-AA                   22  NA                             eGFR-LASHAWN                  18 mL/min/1.73 m2  NA                             Bili Total                0.4 mg/dL                             Bili Direct               0.2 mg/dL                             Bili Indirect             0.20 mg/dL                             AST                       17 unit/L                             ALT                       7 unit/L                             Alk Phos                  82 unit/L                             Total Protein             6.1 gm/dL                             Albumin Lvl               2.6 gm/dL  LOW                             Globulin                  3.5 gm/dL                             A/G Ratio                 0.7 ratio  LOW                             WBC                       8.3 x10(3)/mcL                             RBC                       2.83 x10(6)/mcL  LOW                             Hgb                       7.5 gm/dL  LOW                             Hct                       24.4 %  LOW                              Platelet                  251 x10(3)/mcL                             MCV                       86.2 fL                             MCH                       26.5 pg  LOW                             MCHC                      30.7 gm/dL  LOW                             RDW                       21.2 %  HI                             MPV                       10.9 fL                             Abs Neut                  5.33 x10(3)/mcL                             Neutro Auto               64 %  NA                             Lymph Auto                22 %  NA                             Mono Auto                 7 %  NA                             Eos Auto                  6 %  NA                             Abs Eos                   0.5 x10(3)/mcL                             Basophil Auto             0 %  NA                             Abs Neutro                5.33 x10(3)/mcL                             Abs Lymph                 1.8 x10(3)/mcL                             Abs Mono                  0.6 x10(3)/mcL                             Abs Baso                  0.0 x10(3)/mcL                             ABO/Rh                    B POS  .   Radiology results:  Rad Results (ST)  < 12 hrs   Accession: [id number]  Order: US Pelvic Non-OB w Transvag if needed  Report Dt/Tm: 01/17/2021 21:50  Report:   Clinical History  Pain     Technique  Transabdominal and transvaginal images of the pelvis were obtained.  Images obtained in grayscale and color.     Comparison  No prior imaging available for comparison.     Findings     The UTERUS measures 8.9 x 4.2 x 5.4 cm. The uterus is normal in size,  shape and appearance for age and menstrual status.     The CERVIX contains nabothian cyst.     The ENDOMETRIUM is prominent for patient's age. Blood products are  noted within the canal.     The RIGHT OVARY is nonvisualized secondary to overlying bowel gas.     The LEFT OVARY is normal in size, shape, and appearance for age  and  menstrual status; measuring 2.9 x 1.8 x 2.5 cm. No significant masses  are noted. There are several normal appearing follicles.     There is a physiologic volume of fluid is seen in the CUL-DE-SAC.     Impression  The endometrium is prominent for patient's age. Blood products are  noted within the endometrial canal. Recommend further evaluation for  endometrial hyperplasia. Malignancy is not excluded.               .   Radiology results:  Reported at  1/17/2021 22:10:00, Computed tomography, Abdomen, reviewed radiologist's report, emergency physician interpretation: Technique:CT of the abdomen and pelvis was performed with axial images as well as sagittal and coronal reconstruction images with intravenous contrast but without oral contrast.     Comparison:Comparison is with study dated 2020-12-18.     Clinical History:S/p recent end ileostomy now w considerable rectal pain.     Thorax:  Lungs:The visualized lung bases appear unremarkable.  Pleura:No effusions or thickening.  Heart:The heart size is within normal limits.     Abdomen:  Abdominal Wall:Again noted is ileostomy in the right ventral abdominal wall. There is associated surrounding subcutaneous fat stranding and minimal fluid, not significantly changed since the prior examination.  Liver:Unremarkable appearing liver.  Biliary System:No intrahepatic or extrahepatic biliary duct dilatation is seen.  Gallbladder:Surgical clips are seen in the gallbladder fossa consistent with prior cholecystectomy.  Pancreas:Unremarkable appearing pancreas.  Spleen:Unremarkable appearing spleen.  Adrenals:The adrenal glands appear unremarkable.  Kidneys:The left kidney appears unremarkable with no stones cysts masses or hydronephrosis. Small right renal cortical cysts are seen, not well appreciated on the prior examination due to motion artifact. The right kidney otherwise appears unremarkable.  Aorta:Unremarkable.  IVC:Unremarkable.  Bowel:  Esophagus:The visualized  distal esophagus appears unremarkable.  Stomach:The stomach appears unremarkable.  Duodenum:Unremarkable appearing duodenum.  Small Bowel:Nondistended. There is interval resolution of distal ileal wall thickening and surrounding fat stranding. No evidence of bowel obstruction.  Colon:The patient is status post partial colectomy. The distal sigmoid colon and rectum appears unremarkable.  Peritoneum:No free air and no ascites.     Pelvis:  Bladder:Unremarkable.  Female:  Uterus:Unremarkable.  Ovaries:No adnexal masses are seen.     Bony structures:The bones are osteopenic.  Dorsal Spine:There is mild spondylosis of the visualized dorsal spine. Multiple hemangiomas are seen involving T9, T11, T12 and L1 vertebral bodies.        Impression:  1. Again noted is ileostomy in the right ventral abdominal wall. There is associated surrounding subcutaneous fat stranding and minimal fluid, not significantly changed since the prior examination. This could reflect an infectious component including cellulitis. Correlate with clinical and laboratory findings.  2. Details as above..       Impression and Plan   Diagnosis   Vaginal bleeding (PNED [id number])   Pain in rectum (WJU77-MQ K62.89)   Acute blood loss anemia (KOE59-BV D62)   ARF (acute renal failure) (LCT70-BA N17.9)      Calls-Consults   -  Dr Anand with GYN at 1130  Dr Kenyon for Dr King at 1203.   Plan   Condition: Improved, Stable.    Disposition: Admit to Inpatient Unit.    Counseled: Patient, Regarding diagnosis, Regarding diagnostic results, Regarding treatment plan, Patient indicated understanding of instructions.    Notes: IRowena, acted solely as a scribe for and in the presence of Dr. Karimi who performed the service., ISeamus M.D., personally performed the history, physical exam and medical decision making; and confirmed the accuracy of the information in the transcribed note.
